# Patient Record
Sex: FEMALE | Race: WHITE | URBAN - METROPOLITAN AREA
[De-identification: names, ages, dates, MRNs, and addresses within clinical notes are randomized per-mention and may not be internally consistent; named-entity substitution may affect disease eponyms.]

---

## 2018-01-04 ENCOUNTER — OFFICE VISIT (OUTPATIENT)
Dept: FAMILY MEDICINE CLINIC | Age: 28
End: 2018-01-04

## 2018-01-04 VITALS
DIASTOLIC BLOOD PRESSURE: 68 MMHG | OXYGEN SATURATION: 98 % | SYSTOLIC BLOOD PRESSURE: 118 MMHG | BODY MASS INDEX: 26.03 KG/M2 | HEART RATE: 94 BPM | HEIGHT: 66 IN | WEIGHT: 162 LBS | RESPIRATION RATE: 20 BRPM

## 2018-01-04 DIAGNOSIS — J02.9 ACUTE PHARYNGITIS, UNSPECIFIED ETIOLOGY: Primary | ICD-10-CM

## 2018-01-04 LAB — S PYO AG THROAT QL: NORMAL

## 2018-01-04 PROCEDURE — G8427 DOCREV CUR MEDS BY ELIG CLIN: HCPCS | Performed by: FAMILY MEDICINE

## 2018-01-04 PROCEDURE — 87880 STREP A ASSAY W/OPTIC: CPT | Performed by: FAMILY MEDICINE

## 2018-01-04 PROCEDURE — 1036F TOBACCO NON-USER: CPT | Performed by: FAMILY MEDICINE

## 2018-01-04 PROCEDURE — G8419 CALC BMI OUT NRM PARAM NOF/U: HCPCS | Performed by: FAMILY MEDICINE

## 2018-01-04 PROCEDURE — G8484 FLU IMMUNIZE NO ADMIN: HCPCS | Performed by: FAMILY MEDICINE

## 2018-01-04 PROCEDURE — 99213 OFFICE O/P EST LOW 20 MIN: CPT | Performed by: FAMILY MEDICINE

## 2018-01-04 RX ORDER — LEVETIRACETAM 750 MG/1
750 TABLET ORAL 2 TIMES DAILY
COMMUNITY

## 2018-01-04 RX ORDER — LAMOTRIGINE 50 MG/1
TABLET, ORALLY DISINTEGRATING ORAL
COMMUNITY
End: 2018-02-05

## 2018-01-04 ASSESSMENT — PATIENT HEALTH QUESTIONNAIRE - PHQ9
2. FEELING DOWN, DEPRESSED OR HOPELESS: 0
SUM OF ALL RESPONSES TO PHQ9 QUESTIONS 1 & 2: 0
1. LITTLE INTEREST OR PLEASURE IN DOING THINGS: 0
SUM OF ALL RESPONSES TO PHQ QUESTIONS 1-9: 0

## 2018-01-06 LAB — THROAT CULTURE: NORMAL

## 2018-02-05 ENCOUNTER — OFFICE VISIT (OUTPATIENT)
Dept: FAMILY MEDICINE CLINIC | Age: 28
End: 2018-02-05

## 2018-02-05 ENCOUNTER — TELEPHONE (OUTPATIENT)
Dept: FAMILY MEDICINE CLINIC | Age: 28
End: 2018-02-05

## 2018-02-05 VITALS
WEIGHT: 162 LBS | HEIGHT: 66 IN | DIASTOLIC BLOOD PRESSURE: 78 MMHG | HEART RATE: 96 BPM | SYSTOLIC BLOOD PRESSURE: 127 MMHG | BODY MASS INDEX: 26.03 KG/M2

## 2018-02-05 DIAGNOSIS — L30.9 DERMATITIS: Primary | ICD-10-CM

## 2018-02-05 DIAGNOSIS — R56.9 CONVULSIONS, UNSPECIFIED CONVULSION TYPE (HCC): ICD-10-CM

## 2018-02-05 PROCEDURE — 99213 OFFICE O/P EST LOW 20 MIN: CPT | Performed by: FAMILY MEDICINE

## 2018-02-05 PROCEDURE — 1036F TOBACCO NON-USER: CPT | Performed by: FAMILY MEDICINE

## 2018-02-05 PROCEDURE — G8419 CALC BMI OUT NRM PARAM NOF/U: HCPCS | Performed by: FAMILY MEDICINE

## 2018-02-05 PROCEDURE — G8484 FLU IMMUNIZE NO ADMIN: HCPCS | Performed by: FAMILY MEDICINE

## 2018-02-05 PROCEDURE — G8427 DOCREV CUR MEDS BY ELIG CLIN: HCPCS | Performed by: FAMILY MEDICINE

## 2018-02-05 ASSESSMENT — ENCOUNTER SYMPTOMS
STRIDOR: 0
WHEEZING: 0
VOMITING: 0
DIARRHEA: 0
SHORTNESS OF BREATH: 0
ABDOMINAL PAIN: 0
COUGH: 0
NAUSEA: 0

## 2018-02-05 NOTE — PROGRESS NOTES
Chief Complaint   Patient presents with    Other     Blisters on hands/Jan 10th-hands are stiff & pins and needles feeling       HPI: Lebron Pathak presents for evaluation and management of Painful rash on her hands. Nano notes that about 3 weeks ago her doctor was trying to switch her from [address] to Lamictal for her seizure disorder she developed a painful rash that started on her heels and spread up to her knees and also included the palms of her hands. She notes she had a sensation of pins and needles and microvesicles on her heels and palms. 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 Rash seemed to improve with that. However over the last 2 days patient has noticed a recurrence of painful hand sensation with small little areas of hardness underneath her skin. She has not had a seizure for over 7 or 8 months  Review of Systems   Constitutional: Negative for chills and fever. Respiratory: Negative for cough, shortness of breath, wheezing and stridor. Cardiovascular: Negative for chest pain and palpitations. Gastrointestinal: Negative for abdominal pain, diarrhea, nausea and vomiting. Allergies   Allergen Reactions    Pcn [Penicillins]      New Prescriptions    TRIAMCINOLONE (KENALOG) 0.1 % OINTMENT    Apply topically 2 times daily. Current Outpatient Prescriptions   Medication Sig Dispense Refill    triamcinolone (KENALOG) 0.1 % ointment Apply topically 2 times daily. 30 g 0    levETIRAcetam (KEPPRA) 750 MG tablet Take 750 mg by mouth 2 times daily      FOLIC ACID PO Take by mouth      Prenatal Vit-Fe Fumarate-FA (PRENATAL PO) Take by mouth       No current facility-administered medications for this visit.         Past Medical History:   Diagnosis Date    Abdominal pain, unspecified site     Allergic rhinitis     Anxiety state, unspecified     Migraine, unspecified, without mention of intractable migraine without mention of status migrainosus     Seizure ([facility] [address]     Isolated, partial          Objective   /78   Pulse 96   Ht 5' 6\" (1.676 m)   Wt 162 lb (73.5 kg)   LMP 01/15/2018 (Approximate)   BMI 26.15 kg/m²   Wt Readings from Last 3 Encounters:   02/05/18 162 lb (73.5 kg)   01/04/18 162 lb (73.5 kg)   03/27/14 169 lb (76.7 kg)       WDWN in NAD  Lungs: CTAB BS Equal and Easy, no accessory muscle use  CV: RRR w/o M,R,G, PP2+, no edema  Abd:  BS+, S, ND, NT, no hsm  Skin: 1 mm area of hypopigmentation at the DIP crease of her right third finger. No other skin lesions noted today  Assessment   Plan     1. Dermatitis  Differential diagnosis includes dyshidrotic eczema, Id reaction, and drug reaction. We will treat with triamcinolone cream and continue just Keppra. Follow-up as needed  - triamcinolone (KENALOG) 0.1 % ointment; Apply topically 2 times daily. Dispense: 30 g; Refill: 0    2.  Convulsions, unspecified convulsion type ([address]  Appears stable on Keppra counseled patient I would continue this      RTC as needed

## 2018-02-06 ENCOUNTER — TELEPHONE (OUTPATIENT)
Dept: FAMILY MEDICINE CLINIC | Age: 28
End: 2018-02-06

## 2020-07-22 ENCOUNTER — OFFICE VISIT (OUTPATIENT)
Dept: PRIMARY CARE CLINIC | Age: 30
End: 2020-07-22

## 2020-07-22 PROCEDURE — 99211 OFF/OP EST MAY X REQ PHY/QHP: CPT | Performed by: NURSE PRACTITIONER

## 2020-07-22 NOTE — PROGRESS NOTES
Brendadulce maria Velasquez received a viral test for COVID-19. They were educated on isolation and quarantine as appropriate. For any symptoms, they were directed to seek care from their PCP, given contact information to establish with a doctor, directed to an urgent care or the emergency room.

## 2020-07-28 LAB
SARS-COV-2: NOT DETECTED
SOURCE: NORMAL

## 2023-08-30 ENCOUNTER — OFFICE VISIT (OUTPATIENT)
Age: 33
End: 2023-08-30

## 2023-08-30 VITALS
TEMPERATURE: 99.9 F | OXYGEN SATURATION: 99 % | BODY MASS INDEX: 27.44 KG/M2 | HEART RATE: 94 BPM | SYSTOLIC BLOOD PRESSURE: 128 MMHG | WEIGHT: 170 LBS | DIASTOLIC BLOOD PRESSURE: 84 MMHG

## 2023-08-30 DIAGNOSIS — S93.402A SPRAIN OF LEFT ANKLE, UNSPECIFIED LIGAMENT, INITIAL ENCOUNTER: Primary | ICD-10-CM

## 2023-08-30 DIAGNOSIS — M25.572 ACUTE LEFT ANKLE PAIN: ICD-10-CM
